# Patient Record
Sex: MALE
[De-identification: names, ages, dates, MRNs, and addresses within clinical notes are randomized per-mention and may not be internally consistent; named-entity substitution may affect disease eponyms.]

---

## 2019-10-16 ENCOUNTER — APPOINTMENT (OUTPATIENT)
Dept: VASCULAR SURGERY | Facility: CLINIC | Age: 69
End: 2019-10-16

## 2020-11-12 ENCOUNTER — APPOINTMENT (OUTPATIENT)
Dept: PHYSICAL MEDICINE AND REHAB | Facility: CLINIC | Age: 70
End: 2020-11-12
Payer: MEDICARE

## 2020-11-12 VITALS — SYSTOLIC BLOOD PRESSURE: 105 MMHG | OXYGEN SATURATION: 98 % | HEART RATE: 75 BPM | DIASTOLIC BLOOD PRESSURE: 64 MMHG

## 2020-11-12 DIAGNOSIS — Z87.39 PERSONAL HISTORY OF OTHER DISEASES OF THE MUSCULOSKELETAL SYSTEM AND CONNECTIVE TISSUE: ICD-10-CM

## 2020-11-12 DIAGNOSIS — Z86.39 PERSONAL HISTORY OF OTHER ENDOCRINE, NUTRITIONAL AND METABOLIC DISEASE: ICD-10-CM

## 2020-11-12 PROCEDURE — 99204 OFFICE O/P NEW MOD 45 MIN: CPT

## 2020-11-12 RX ORDER — FUROSEMIDE 80 MG/1
TABLET ORAL
Refills: 0 | Status: ACTIVE | COMMUNITY

## 2020-11-12 RX ORDER — PREGABALIN 25 MG/1
25 CAPSULE ORAL TWICE DAILY
Qty: 60 | Refills: 1 | Status: ACTIVE | COMMUNITY
Start: 2020-11-12 | End: 1900-01-01

## 2020-11-12 RX ORDER — LEVOTHYROXINE SODIUM 137 UG/1
TABLET ORAL
Refills: 0 | Status: ACTIVE | COMMUNITY

## 2020-11-12 RX ORDER — SPIRONOLACTONE 50 MG/1
TABLET ORAL
Refills: 0 | Status: ACTIVE | COMMUNITY

## 2020-11-12 RX ORDER — OMEPRAZOLE 20 MG/1
20 CAPSULE, DELAYED RELEASE ORAL DAILY
Refills: 0 | Status: ACTIVE | COMMUNITY

## 2020-11-12 RX ORDER — TAMSULOSIN HYDROCHLORIDE 0.4 MG/1
0.4 CAPSULE ORAL
Refills: 0 | Status: ACTIVE | COMMUNITY

## 2020-11-17 ENCOUNTER — APPOINTMENT (OUTPATIENT)
Dept: OTOLARYNGOLOGY | Facility: CLINIC | Age: 70
End: 2020-11-17
Payer: MEDICARE

## 2020-11-17 DIAGNOSIS — H90.3 SENSORINEURAL HEARING LOSS, BILATERAL: ICD-10-CM

## 2020-11-17 DIAGNOSIS — R42 DIZZINESS AND GIDDINESS: ICD-10-CM

## 2020-11-17 PROCEDURE — 99204 OFFICE O/P NEW MOD 45 MIN: CPT

## 2020-11-18 PROBLEM — H90.3 BILATERAL SENSORINEURAL HEARING LOSS: Status: ACTIVE | Noted: 2020-11-18

## 2020-11-18 PROBLEM — R42 DISEQUILIBRIUM: Status: ACTIVE | Noted: 2020-11-18

## 2020-11-18 NOTE — END OF VISIT
[FreeTextEntry3] : I was present for pertinent history and physical examination.  Agree with plan as above.

## 2020-11-18 NOTE — ASSESSMENT
[FreeTextEntry1] : Mr. 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  He presents for evaluation of neuropathy. \par \par Based on clinical examination and review of available imaging, suspect etiology of symptoms multifactorial possibly related to chemotherapy and vascular insufficiency with accompanied edema.\par \par Plan:\par - Prescribed Pregablin 25mg BID for neuropathic pain. Patient did not tolerate Neurontin in the past. \par - Patient without gait dysfunction at this time and would defer physical therapy referral at this time.\par - Educated patient on proper use of compression stockings to control edema.\par - follow up in 1 month to re-evaluate symptoms and medication tolerance

## 2020-11-18 NOTE — HISTORY OF PRESENT ILLNESS
[FreeTextEntry1] : Mr. 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  He presents for evaluation of neuropathy. \par \par Patient reports he has had progressive lower extremity paresthesias for the past few months that are painful in nature. He states he did not have these symptoms during or after completing chemotherapy. Sensation is in both legs from b/l shin down to dorsum and plantar aspect of feet. Denies any significant back pain or radiation down the legs. He feels he has sensation but describes the discomfort as painful and worse at end of the day and night.  He denies feeling unsteady on his feet or recent falls or near-falls. Does not need any assisted devices. 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 \par \par Of note, patient reports he has had extensive work up for this pain/discomfort.  He states he has had "nerve tests" in the past by a neurologist.  Recent lab work was relatively unremarkable (LFTs, B12, A1c within normal limits, Platelets >80).  Additionally, reports a history of laminectomy years ago at [facility] and has been stable from a back pain standpoint. Dopplers negative for DVT.   He states he was seen  by a vascular doctor and had a procedure performed earlier this year in both legs. Unclear what exact procedure it was. \par \par For pain he reports that he has tried gabapentin but did not tolerate it as it caused him to have strange behaviors where he would be forgetful about what he was doing. He has used numerous topical creams for pain including voltaren gel as well as capsaicin cream with limited relief. \par  \par \par

## 2020-11-18 NOTE — SOCIAL HISTORY
[Private Home] : in a private home [No Device Needed] : Patient doesn't use a device for ambulation [de-identified] : family

## 2020-11-18 NOTE — ADDENDUM
[FreeTextEntry1] : Patient tolerating lyrica well without side effects.  Will increase to 25mg in the morning and 50mg at night for one week.  If tolerating then increase to 50mg BID.  Follow up in 2 weeks.

## 2020-11-18 NOTE — PHYSICAL EXAM
[FreeTextEntry1] : General: Awake and alert in no acute distress\par Psych: normal mood and affect\par HEENT: NC/AT, normal visual tracking\par Pulmonary: no resp distress, chest expansion appears symmetrical\par CV: extremities are warm and perfused\par Abd: non-distended\par Ext: no c/c/e\par Skin: chronic vascular skin changes, 1+ pitting edema b/l L>R, tense \par \par \par Gait - non-antalgic, good foot clearance\par Inspection: normal muscle bulk without asymmetry\par Tenderness to palpation: no TTP over PSIS, lumbar paraspinal, greater trochanter, sacroiliac joints, piriformis\par ROM: within functional limits\par MMT: 5/5 bilateral lower extremities (HF, KE, KF, DF, PF, EHL)\par Reflexes: symmetric bilateral achilles and patella , no clonus\par Sensory: intact to light touch in all dermatomes of the bilateral lower extremities\par \par Provocative testing:\par modified SLR - negative\par

## 2020-11-18 NOTE — PHYSICAL EXAM
[Hearing Loss Right Only] : diminished [Hearing Loss Left Only] : diminished [Rinne Test Air Conduction Persists > Bone Conduction Right] : air conduction greater than bone conduction on the right [Rinne Test Air Conduction Persists > Bone Conduction Left] : air conduction greater than bone conduction on the left [Perry Test Lateralizes To Right] : tone lateralization to the right [Nystagmus] : ~T no ~M nystagmus was seen [Fukuda Step Test] : Fukuda Step Test was Positive [Romberg's Sign] : Romberg's sign was present [Fistula Sign] : Fistula Sign: Negative [Past-Pointing] : Past-Pointing: Negative [Caroline-Halljeannieke] : Chadron-Hallpike: Negative [de-identified] : wide based [FreeTextEntry1] : +HIT bilaterally [Midline] : trachea located in midline position [Normal] : no rashes

## 2020-11-18 NOTE — HISTORY OF PRESENT ILLNESS
[de-identified] : 71yo man with disequilibrium while walking\par since last yr\par much worse last 2 mos\par has h/o esophageal ca, s/p rsxn, chemo and RT\par was told had peripheral neuropathy lower extremities\par also had hearing test, L ear worse per pt\par eye exam ok, had cataracts removed 2 yrs ago\par pt is a gutiérrez, still able to work\par

## 2020-11-18 NOTE — REVIEW OF SYSTEMS
[Joint Pain] : joint pain [Negative] : Psychiatric [FreeTextEntry9] : as per HPI [de-identified] : paresthesias b/l feet

## 2020-12-03 ENCOUNTER — APPOINTMENT (OUTPATIENT)
Dept: PHYSICAL MEDICINE AND REHAB | Facility: CLINIC | Age: 70
End: 2020-12-03
Payer: MEDICARE

## 2020-12-03 VITALS — TEMPERATURE: 98.2 F

## 2020-12-03 VITALS — SYSTOLIC BLOOD PRESSURE: 137 MMHG | OXYGEN SATURATION: 98 % | DIASTOLIC BLOOD PRESSURE: 78 MMHG | HEART RATE: 65 BPM

## 2020-12-03 DIAGNOSIS — M79.18 MYALGIA, OTHER SITE: ICD-10-CM

## 2020-12-03 PROCEDURE — 99214 OFFICE O/P EST MOD 30 MIN: CPT | Mod: 25

## 2020-12-03 PROCEDURE — 98929 OSTEOPATH MANJ 9-10 REGIONS: CPT

## 2020-12-03 RX ORDER — PREGABALIN 75 MG/1
75 CAPSULE ORAL TWICE DAILY
Qty: 60 | Refills: 1 | Status: ACTIVE | COMMUNITY
Start: 2020-12-03 | End: 1900-01-01

## 2020-12-03 NOTE — ASSESSMENT
[FreeTextEntry1] : Mr. 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  Will increase Lyrica to 75 mg twice a day.\par -Laboratory values reviewed prior to initiating change.\par \par #Impaired balance\par -Multifactorial secondary to neuropathy and disequilibrium\par -Continue physical therapy\par -Continue to monitor platelet counts, given fall risk.\par \par #Myofascial pain:\par -Diffuse myofascial pain on exam today.  Patient is interested in trying OMT today.\par \par Osteopathic structural exam demonstrates somatic dysfunction patient agreed to osteopathic manual medicine.\par \par 1.  Somatic dysfunction cranial\par -OMT performed craniosacral therapy\par 2.  Somatic dysfunction cervical\par -OMT performed counterstrain\par 3.  Somatic dysfunction thoracic\par -OMT performed with muscle energy\par 4.  Somatic dysfunction upper extremity\par -OMT performed counterstrain\par 5.  Somatic dysfunction lumbar\par -OMT performed counterstrain\par 6.  Somatic dysfunction pelvis\par -OMT performed with muscle energy\par 7.  Somatic dysfunction sacrum\par -OMT performed with myofascial release\par 8.  Somatic dysfunction rib\par -OMT performed with muscle energy\par 9.  Somatic dysfunction lower extremity\par -OMT performed with myofascial release and muscle energy\par \par Patient tolerated the treatment well.\par \par Follow-up in 3-4 weeks. \par \par

## 2020-12-03 NOTE — PHYSICAL EXAM
[FreeTextEntry1] : Gen: Patient is A&O x 3, NAD\par HEENT: EOMI, hearing grossly normal\par Resp: regular, non - labored\par CV: pulses regular\par Skin: no rashes, erythema\par Lymph: + bilateral lower extremity 1+ edema\par Inspection: no instability or misalignment\par ROM: Limited in left shoulder abduction to 130 degrees\par Palpation:  TTP lumbar and thoracic paraspinals \par Sensation: decreased distally and symmetrically \par Reflexes: 1+ and symmetric throughout\par Strength: 5/5 throughout\par Special tests: -Straight leg raise, -Sapp's sign \par Gait: unable to stand tandem\par \par Osteopathic Structural Exam:\par Cranial: Right torsion\par Cervical: Right C3, C6 tenderpoints\par Thoracic: T3 ERRSBR, T4 ERLSBL\par Rib: Left superior rib 1 \par Upper extremity: Left levator scapula tenderpoint\par Lumbar: L5 tenderpoint posterior \par Pelvis: Left medial innominate \par Sacrum: Sacral flexion\par Lower Extremity: Left hip restricted in internal rotation, right hip restricted in external rotation \par \par \par \par \par \par

## 2020-12-31 ENCOUNTER — APPOINTMENT (OUTPATIENT)
Dept: PHYSICAL MEDICINE AND REHAB | Facility: CLINIC | Age: 70
End: 2020-12-31
Payer: MEDICARE

## 2020-12-31 VITALS — OXYGEN SATURATION: 98 % | SYSTOLIC BLOOD PRESSURE: 130 MMHG | DIASTOLIC BLOOD PRESSURE: 77 MMHG | HEART RATE: 72 BPM

## 2020-12-31 DIAGNOSIS — M99.06 SEGMENTAL AND SOMATIC DYSFUNCTION OF LOWER EXTREMITY: ICD-10-CM

## 2020-12-31 DIAGNOSIS — M99.08 SEGMENTAL AND SOMATIC DYSFUNCTION OF RIB CAGE: ICD-10-CM

## 2020-12-31 DIAGNOSIS — M79.2 NEURALGIA AND NEURITIS, UNSPECIFIED: ICD-10-CM

## 2020-12-31 PROCEDURE — 98929 OSTEOPATH MANJ 9-10 REGIONS: CPT

## 2020-12-31 PROCEDURE — 99214 OFFICE O/P EST MOD 30 MIN: CPT | Mod: 25

## 2020-12-31 RX ORDER — PREGABALIN 50 MG/1
50 CAPSULE ORAL
Qty: 90 | Refills: 1 | Status: ACTIVE | COMMUNITY
Start: 2020-12-31 | End: 1900-01-01

## 2020-12-31 NOTE — PHYSICAL EXAM
[FreeTextEntry1] : Gen: Patient is A&O x 3, NAD\par HEENT: EOMI, hearing grossly normal\par Resp: regular, non - labored\par CV: pulses regular\par Skin: no rashes, erythema\par Lymph: + bilateral lower extremity 1+ edema\par Inspection: no instability or misalignment\par ROM: Limited in left shoulder abduction to 140 degrees\par Palpation: TTP lumbar and thoracic paraspinals and cervical paraspinals \par Sensation: decreased distally and symmetrically \par Reflexes: 1+ and symmetric throughout\par Strength: 5/5 throughout\par Special tests: -Straight leg raise, -Sapp's sign \par Gait: unable to stand tandem, forward flexed posture \par \par Osteopathic Structural Exam:\par Cranial: Right torsion\par Cervical: Left C2 tenderpoint \par Thoracic: T2-T4 NRRSBL\par Rib: Left superior rib 1 \par Upper extremity: Right levator scapula tenderpoint\par Lumbar: L4-L5 NRLSBR\par Pelvis: Right superior innominate \par Sacrum: Sacral flexion \par Lower Extremity: Right hip restricted in internal rotation \par \par

## 2020-12-31 NOTE — ASSESSMENT
[FreeTextEntry1] : Mr. 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  CMP reviewed.  \par \par #Impaired balance\par -Multifactorial secondary to neuropathy and disequilibrium\par -Will restart vestibular therapy after shoulder surgery.\par -Educated on home exercise program for lumbar strengthening, while avoiding increasing intra-abdominal pressure \par \par #Myofascial pain:\par -Diffuse myofascial pain on exam today. Patient is interested in trying OMT today.\par \par Osteopathic structural exam demonstrates somatic dysfunction patient agreed to osteopathic manual medicine.\par \par 1. Somatic dysfunction cranial\par -OMT performed craniosacral therapy\par 2. Somatic dysfunction cervical\par -OMT performed counterstrain\par 3. Somatic dysfunction thoracic\par -OMT performed with muscle energy\par 4. Somatic dysfunction upper extremity\par -OMT performed with counterstrain\par 5. Somatic dysfunction lumbar\par -OMT performed with myofascial release \par 6. Somatic dysfunction pelvis\par -OMT performed with muscle energy\par 7. Somatic dysfunction sacrum\par -OMT performed with myofascial release\par 8. Somatic dysfunction rib\par -OMT performed with muscle energy and myofascial release \par 9. Somatic dysfunction lower extremity\par -OMT performed with myofascial release and muscle energy\par \par Patient tolerated the treatment well.\par \par Follow-up in 3-4 weeks. \par

## 2020-12-31 NOTE — HISTORY OF PRESENT ILLNESS
[FreeTextEntry1] : Mr. 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 \par \par He is presenting today for follow-up evaluation.  He is currently taking Lyrica 75 mg twice a day.  He reports he thinks this may be helping a little.  He does feel increased sedation during the day however and pain is worse at night.  He denies any side effects other than some daytime sedation.  Neuropathy is still worse in his bilateral feet worse at night with light touch.\par \par \par He still reports low back pain and upper arm tightness.  He is planning to undergo left shoulder arthroscopy within the next week.  He thought OMT was slightly helpful last time.  He has stopped vestibular therapy due to his upcoming surgery.\par

## 2021-01-28 ENCOUNTER — APPOINTMENT (OUTPATIENT)
Dept: PHYSICAL MEDICINE AND REHAB | Facility: CLINIC | Age: 71
End: 2021-01-28
Payer: MEDICARE

## 2021-01-28 VITALS — HEART RATE: 78 BPM | SYSTOLIC BLOOD PRESSURE: 135 MMHG | RESPIRATION RATE: 16 BRPM | DIASTOLIC BLOOD PRESSURE: 84 MMHG

## 2021-01-28 PROCEDURE — 98929 OSTEOPATH MANJ 9-10 REGIONS: CPT

## 2021-01-28 PROCEDURE — 99214 OFFICE O/P EST MOD 30 MIN: CPT | Mod: 25

## 2021-01-28 RX ORDER — PREGABALIN 50 MG/1
50 CAPSULE ORAL
Qty: 90 | Refills: 0 | Status: ACTIVE | COMMUNITY
Start: 2021-01-28 | End: 1900-01-01

## 2021-01-28 NOTE — REVIEW OF SYSTEMS
[Negative] : Heme/Lymph [FreeTextEntry9] : + Neck pain, +Low back pain [de-identified] : + Numbness in feet

## 2021-01-28 NOTE — HISTORY OF PRESENT ILLNESS
[FreeTextEntry1] : Mr. 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 \par \par He reports that since his last visit he increased Lyrica to 100 mg at night.  He reports he is sleeping better throughout the night.  He is having less cramping and spasms in his legs.  He does have some pain prior to going to bed.\par \par He reports his balance is mildly improving.  He had to stop vestibular therapy due to his recent shoulder surgery.  He continues in physical therapy for shoulder.\par \par He reports he continues to have spasms in his neck and his low back.  OMT at his last visit continue to help with this pain.  He denies any new weakness or numbness and tingling.\par

## 2021-01-28 NOTE — ASSESSMENT
[FreeTextEntry1] : Mr. 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) with mildly enlarged LNs on both sides, hemochromatosis, thrombocytopenia d/t liver cirrhosis, s/p neoadjuvant chemo­XRT, followed by surgical resection with esophagectomy/gastric pull through and LN resection all negative, s/p paracentesis, now with worsening ventral hernias, s/p hernia repair surgery end of 9/18.\par \par #Neuropathy\par -Previous intolerance to gabapentin\par -Increase Lyrica to 150mg nightly\par -CMP, CBC reviewed\par -Oncology notes reviewed \par \par #Impaired balance\par -Multifactorial secondary to neuropathy and disequilibrium\par -Will restart vestibular therapy after shoulder surgery.\par -Cranial therapy performed today\par \par #Myofascial pain:\par -Osteopathic structural exam demonstrates somatic dysfunction patient agreed to osteopathic manual medicine.\par \par 1. Somatic dysfunction cranial\par -OMT performed craniosacral therapy and temporal balancing \par 2. Somatic dysfunction cervical\par -OMT performed counterstrain\par 3. Somatic dysfunction thoracic\par -OMT performed with muscle energy\par 4. Somatic dysfunction upper extremity\par -OMT performed with counterstrain\par 5. Somatic dysfunction lumbar\par -OMT performed with muscle energy \par 6. Somatic dysfunction pelvis\par -OMT performed with muscle energy\par 7. Somatic dysfunction sacrum\par -OMT performed with craniosacral therapy \par 8. Somatic dysfunction rib\par -OMT performed with counterstrain  \par 9. Somatic dysfunction lower extremity\par -OMT performed with myofascial release and muscle energy\par \par Patient tolerated the treatment well.\par \par Follow-up in 3-4 weeks. \par

## 2021-02-18 ENCOUNTER — APPOINTMENT (OUTPATIENT)
Dept: PHYSICAL MEDICINE AND REHAB | Facility: CLINIC | Age: 71
End: 2021-02-18
Payer: MEDICARE

## 2021-02-18 VITALS — DIASTOLIC BLOOD PRESSURE: 78 MMHG | HEART RATE: 82 BPM | SYSTOLIC BLOOD PRESSURE: 142 MMHG | RESPIRATION RATE: 16 BRPM

## 2021-02-18 DIAGNOSIS — G62.9 POLYNEUROPATHY, UNSPECIFIED: ICD-10-CM

## 2021-02-18 DIAGNOSIS — M99.02 SEGMENTAL AND SOMATIC DYSFUNCTION OF THORACIC REGION: ICD-10-CM

## 2021-02-18 DIAGNOSIS — M99.03 SEGMENTAL AND SOMATIC DYSFUNCTION OF LUMBAR REGION: ICD-10-CM

## 2021-02-18 DIAGNOSIS — M99.01 SEGMENTAL AND SOMATIC DYSFUNCTION OF CERVICAL REGION: ICD-10-CM

## 2021-02-18 DIAGNOSIS — M99.05 SEGMENTAL AND SOMATIC DYSFUNCTION OF PELVIC REGION: ICD-10-CM

## 2021-02-18 DIAGNOSIS — M99.00 SEGMENTAL AND SOMATIC DYSFUNCTION OF HEAD REGION: ICD-10-CM

## 2021-02-18 DIAGNOSIS — R26.89 OTHER ABNORMALITIES OF GAIT AND MOBILITY: ICD-10-CM

## 2021-02-18 DIAGNOSIS — M99.07 SEGMENTAL AND SOMATIC DYSFUNCTION OF UPPER EXTREMITY: ICD-10-CM

## 2021-02-18 DIAGNOSIS — M99.04 SEGMENTAL AND SOMATIC DYSFUNCTION OF SACRAL REGION: ICD-10-CM

## 2021-02-18 PROCEDURE — 98928 OSTEOPATH MANJ 7-8 REGIONS: CPT

## 2021-02-18 PROCEDURE — 99214 OFFICE O/P EST MOD 30 MIN: CPT | Mod: 25

## 2021-02-18 NOTE — PHYSICAL EXAM
[FreeTextEntry1] : Gen: Patient is A&O x 3, NAD\par HEENT: EOMI, hearing grossly normal\par Resp: regular, non - labored\par CV: pulses regular\par Skin: no rashes, erythema\par Lymph: + bilateral lower extremity 1+ edema\par Inspection: no instability or misalignment\par ROM: Limited in left shoulder abduction to 160 degrees\par Palpation: TTP lumbar and cervical paraspinals \par Sensation: decreased distally and symmetrically in bilateral feet\par Reflexes: 1+ and symmetric throughout\par Strength: 5/5 throughout\par Special tests: -Straight leg raise, -Sapp's sign \par Gait: Improved 2 point gait pattern without balance checks \par \par Osteopathic Structural Exam:\par Cranial: Left Right torsion \par Cervical: Right C4 tenderpoint \par Thoracic: T4-6 NRRSBL\par Upper extremity: Right levator scapula and trapezius tenderpoints \par Lumbar: L4-5 NRLSBR\par Pelvis: Left superior innominate \par Sacrum: Sacral flexion \par \par

## 2021-02-18 NOTE — ASSESSMENT
[FreeTextEntry1] : Mr. 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) with mildly enlarged LNs on both sides, hemochromatosis, thrombocytopenia d/t liver cirrhosis, s/p neoadjuvant chemo­XRT, followed by surgical resection with esophagectomy/gastric pull through and LN resection all negative, s/p paracentesis, now with worsening ventral hernias, s/p hernia repair surgery.\par \par #Neuropathy\par -Previous intolerance to gabapentin\par -Increase Lyrica to 200mg nightly\par -CMP, CBC reviewed\par -Oncology notes reviewed \par \par #Impaired balance\par -Multifactorial secondary to neuropathy and disequilibrium\par -Improving with cranial therapy, consider restarting vestibular therapy once shoulder has improved\par \par #Myofascial pain:\par -Osteopathic structural exam demonstrates somatic dysfunction patient agreed to osteopathic manual medicine.\par \par 1. Somatic dysfunction cranial\par -OMT performed craniosacral therapy and temporal balancing \par 2. Somatic dysfunction cervical\par -OMT performed counterstrain\par 3. Somatic dysfunction thoracic\par -OMT performed with muscle energy\par 4. Somatic dysfunction upper extremity\par -OMT performed with counterstrain\par 5. Somatic dysfunction lumbar\par -OMT performed with muscle energy \par 6. Somatic dysfunction pelvis\par -OMT performed with muscle energy\par 7. Somatic dysfunction sacrum\par -OMT performed with craniosacral therapy \par \par Patient tolerated the treatment well.\par \par Follow-up in 3-4 weeks. \par

## 2021-02-18 NOTE — HISTORY OF PRESENT ILLNESS
[FreeTextEntry1] : Mr. 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 \par \par He is taking Lyrica 150 mg at night.  He reports this increase has continues to help his pain.  He still does have some episodes of shooting pain at night, but they are less severe and for a lesser duration of time.  He denies any side effects.  \par \par He reports his balance is improving and he thinks he is walking better.  He thinks the OMT is helping with this and his back/neck pain.  He continues in PT for his shoulder s/p surgery.

## 2021-02-18 NOTE — REVIEW OF SYSTEMS
[Negative] : Heme/Lymph [FreeTextEntry9] : + Neck and low back pain [de-identified] : + Neuropathy

## 2021-03-08 RX ORDER — PREGABALIN 200 MG/1
200 CAPSULE ORAL
Qty: 30 | Refills: 1 | Status: ACTIVE | COMMUNITY
Start: 2021-03-08 | End: 1900-01-01

## 2021-03-11 ENCOUNTER — APPOINTMENT (OUTPATIENT)
Dept: PHYSICAL MEDICINE AND REHAB | Facility: CLINIC | Age: 71
End: 2021-03-11

## 2021-07-29 NOTE — HISTORY OF PRESENT ILLNESS
Most spouse called for recommendation(this encounter was inadvertenly closed prior to reviewing dr Jennifer Nye recommendation)  Most spouse understands directions  Dr Jennifer Nye stated to keep august appt but spouse would like to confirm that is still the case since she is just starting provera today    Dr Lawyer Apodaca, please advise [FreeTextEntry1] : Mr. 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 \par \par He is presenting for follow-up evaluation of neuropathy and impaired balance.  Since his last visit he started Lyrica.  He titrated his Lyrica up to 50 mg twice a day.  He denies any side effects from this.  Thinks it may be minimally beneficial at this time.  He still reports some issues with balance.  Recently saw ENT.  He denies any recent falls.  He also is reporting diffuse tightness throughout his body.  He does have a history of a lumbar laminectomy and fusion.  He also reports shoulder surgery on both sides.  Tightness is worse in the low back and upper thoracic region.  He describes an achiness.  He denies any shooting pains new numbness or tingling or weakness.\par \par

## 2021-12-23 ENCOUNTER — APPOINTMENT (OUTPATIENT)
Dept: GASTROENTEROLOGY | Facility: CLINIC | Age: 71
End: 2021-12-23

## 2022-04-13 ENCOUNTER — TRANSCRIPTION ENCOUNTER (OUTPATIENT)
Age: 72
End: 2022-04-13

## 2022-04-14 ENCOUNTER — TRANSCRIPTION ENCOUNTER (OUTPATIENT)
Age: 72
End: 2022-04-14

## 2022-04-14 ENCOUNTER — OUTPATIENT (OUTPATIENT)
Dept: OUTPATIENT SERVICES | Facility: HOSPITAL | Age: 72
LOS: 1 days | End: 2022-04-14
Payer: MEDICARE

## 2022-04-14 ENCOUNTER — RESULT REVIEW (OUTPATIENT)
Age: 72
End: 2022-04-14

## 2022-04-14 VITALS
TEMPERATURE: 98 F | DIASTOLIC BLOOD PRESSURE: 83 MMHG | RESPIRATION RATE: 18 BRPM | HEART RATE: 92 BPM | OXYGEN SATURATION: 97 % | SYSTOLIC BLOOD PRESSURE: 130 MMHG

## 2022-04-14 VITALS
TEMPERATURE: 98 F | OXYGEN SATURATION: 95 % | RESPIRATION RATE: 17 BRPM | HEIGHT: 67 IN | SYSTOLIC BLOOD PRESSURE: 117 MMHG | DIASTOLIC BLOOD PRESSURE: 82 MMHG | HEART RATE: 63 BPM | WEIGHT: 175.93 LBS

## 2022-04-14 DIAGNOSIS — Z98.890 OTHER SPECIFIED POSTPROCEDURAL STATES: Chronic | ICD-10-CM

## 2022-04-14 DIAGNOSIS — Z85.01 PERSONAL HISTORY OF MALIGNANT NEOPLASM OF ESOPHAGUS: Chronic | ICD-10-CM

## 2022-04-14 DIAGNOSIS — N62 HYPERTROPHY OF BREAST: ICD-10-CM

## 2022-04-14 DIAGNOSIS — N63.0 UNSPECIFIED LUMP IN UNSPECIFIED BREAST: ICD-10-CM

## 2022-04-14 LAB
ABO RH CONFIRMATION: SIGNIFICANT CHANGE UP
BLD GP AB SCN SERPL QL: SIGNIFICANT CHANGE UP
HCT VFR BLD CALC: 45.1 % — SIGNIFICANT CHANGE UP (ref 39–50)
HGB BLD-MCNC: 15.4 G/DL — SIGNIFICANT CHANGE UP (ref 13–17)
MCHC RBC-ENTMCNC: 34.1 GM/DL — SIGNIFICANT CHANGE UP (ref 32–36)
MCHC RBC-ENTMCNC: 34.8 PG — HIGH (ref 27–34)
MCV RBC AUTO: 101.8 FL — HIGH (ref 80–100)
NRBC # BLD: 0 /100 WBCS — SIGNIFICANT CHANGE UP (ref 0–0)
PLATELET # BLD AUTO: 77 K/UL — LOW (ref 150–400)
RBC # BLD: 4.43 M/UL — SIGNIFICANT CHANGE UP (ref 4.2–5.8)
RBC # FLD: 14 % — SIGNIFICANT CHANGE UP (ref 10.3–14.5)
WBC # BLD: 3.89 K/UL — SIGNIFICANT CHANGE UP (ref 3.8–10.5)
WBC # FLD AUTO: 3.89 K/UL — SIGNIFICANT CHANGE UP (ref 3.8–10.5)

## 2022-04-14 PROCEDURE — 88305 TISSUE EXAM BY PATHOLOGIST: CPT

## 2022-04-14 PROCEDURE — P9037: CPT

## 2022-04-14 PROCEDURE — P9100: CPT

## 2022-04-14 PROCEDURE — 85027 COMPLETE CBC AUTOMATED: CPT

## 2022-04-14 PROCEDURE — 36415 COLL VENOUS BLD VENIPUNCTURE: CPT

## 2022-04-14 PROCEDURE — 88305 TISSUE EXAM BY PATHOLOGIST: CPT | Mod: 26

## 2022-04-14 PROCEDURE — 86900 BLOOD TYPING SEROLOGIC ABO: CPT

## 2022-04-14 PROCEDURE — 86850 RBC ANTIBODY SCREEN: CPT

## 2022-04-14 PROCEDURE — 36430 TRANSFUSION BLD/BLD COMPNT: CPT | Mod: XU

## 2022-04-14 PROCEDURE — 19120 REMOVAL OF BREAST LESION: CPT | Mod: 50

## 2022-04-14 PROCEDURE — 86901 BLOOD TYPING SEROLOGIC RH(D): CPT

## 2022-04-14 DEVICE — CLIP APPLIER ETHICON LIGACLIP 11.5" MEDIUM: Type: IMPLANTABLE DEVICE | Status: FUNCTIONAL

## 2022-04-14 DEVICE — CLIP APPLIER ETHICON LIGACLIP 9 3/8" SMALL: Type: IMPLANTABLE DEVICE | Status: FUNCTIONAL

## 2022-04-14 RX ORDER — ONDANSETRON 8 MG/1
4 TABLET, FILM COATED ORAL ONCE
Refills: 0 | Status: DISCONTINUED | OUTPATIENT
Start: 2022-04-14 | End: 2022-04-14

## 2022-04-14 RX ORDER — TAMSULOSIN HYDROCHLORIDE 0.4 MG/1
2 CAPSULE ORAL
Qty: 0 | Refills: 0 | DISCHARGE

## 2022-04-14 RX ORDER — SODIUM CHLORIDE 9 MG/ML
1000 INJECTION, SOLUTION INTRAVENOUS
Refills: 0 | Status: DISCONTINUED | OUTPATIENT
Start: 2022-04-14 | End: 2022-04-14

## 2022-04-14 RX ORDER — LEVOTHYROXINE SODIUM 125 MCG
1 TABLET ORAL
Qty: 0 | Refills: 0 | DISCHARGE

## 2022-04-14 RX ORDER — FOLIC ACID 0.8 MG
1 TABLET ORAL
Qty: 0 | Refills: 0 | DISCHARGE

## 2022-04-14 RX ORDER — HYDROMORPHONE HYDROCHLORIDE 2 MG/ML
1 INJECTION INTRAMUSCULAR; INTRAVENOUS; SUBCUTANEOUS
Refills: 0 | Status: DISCONTINUED | OUTPATIENT
Start: 2022-04-14 | End: 2022-04-14

## 2022-04-14 RX ORDER — HYDROMORPHONE HYDROCHLORIDE 2 MG/ML
0.5 INJECTION INTRAMUSCULAR; INTRAVENOUS; SUBCUTANEOUS
Refills: 0 | Status: DISCONTINUED | OUTPATIENT
Start: 2022-04-14 | End: 2022-04-14

## 2022-04-14 RX ORDER — PANTOPRAZOLE SODIUM 20 MG/1
0 TABLET, DELAYED RELEASE ORAL
Qty: 0 | Refills: 0 | DISCHARGE

## 2022-04-14 RX ORDER — BACLOFEN 100 %
0 POWDER (GRAM) MISCELLANEOUS
Qty: 0 | Refills: 0 | DISCHARGE

## 2022-04-14 RX ORDER — OXYCODONE HYDROCHLORIDE 5 MG/1
5 TABLET ORAL ONCE
Refills: 0 | Status: DISCONTINUED | OUTPATIENT
Start: 2022-04-14 | End: 2022-04-14

## 2022-04-14 RX ORDER — FUROSEMIDE 40 MG
3 TABLET ORAL
Qty: 0 | Refills: 0 | DISCHARGE

## 2022-04-14 RX ORDER — ZOLPIDEM TARTRATE 10 MG/1
0.5 TABLET ORAL
Qty: 0 | Refills: 0 | DISCHARGE

## 2022-04-14 RX ORDER — METOPROLOL TARTRATE 50 MG
1 TABLET ORAL
Qty: 0 | Refills: 0 | DISCHARGE

## 2022-04-14 RX ADMIN — SODIUM CHLORIDE 50 MILLILITER(S): 9 INJECTION, SOLUTION INTRAVENOUS at 09:02

## 2022-04-14 NOTE — ASU DISCHARGE PLAN (ADULT/PEDIATRIC) - NS MD DC FALL RISK RISK
For information on Fall & Injury Prevention, visit: [email or web address] OR  [email or web address] OR  https://www.cdc.gov/steadi/patient.html

## 2022-04-14 NOTE — ASU DISCHARGE PLAN (ADULT/PEDIATRIC) - CALL YOUR DOCTOR IF YOU HAVE ANY OF THE FOLLOWING:
Bleeding that does not stop/Swelling that gets worse/Pain not relieved by Medications/Inability to tolerate liquids or foods Bleeding that does not stop/Swelling that gets worse/Pain not relieved by Medications/Fever greater than (need to indicate Fahrenheit or Celsius)/Wound/Surgical Site with redness, or foul smelling discharge or pus/Nausea and vomiting that does not stop/Inability to tolerate liquids or foods

## 2022-04-14 NOTE — ASU PATIENT PROFILE, ADULT - NSICDXPASTMEDICALHX_GEN_ALL_CORE_FT
PAST MEDICAL HISTORY:  Arthritis     Cardiac arrhythmia     Cirrhosis     Esophageal cancer     History of depression     Hypothyroidism

## 2022-04-14 NOTE — ASU DISCHARGE PLAN (ADULT/PEDIATRIC) - CARE PROVIDER_API CALL
Ezequiel Griffin)  Plastic Surgery  [address], [address]  [place] [address]  Phone: [phone]  Fax: [phone]  Follow Up Time: 1 week

## 2022-04-14 NOTE — ASU PATIENT PROFILE, ADULT - NSICDXPASTSURGICALHX_GEN_ALL_CORE_FT
PAST SURGICAL HISTORY:  H/O repair of left rotator cuff     H/O: knee surgery     History of esophageal cancer surgery    History of laminectomy     History of umbilical hernia repair

## 2022-04-14 NOTE — BRIEF OPERATIVE NOTE - NSICDXBRIEFPROCEDURE_GEN_ALL_CORE_FT
Sacha Mendoza(Attending)
PROCEDURES:  Excision of gynecomastia 14-Apr-2022 12:19:57 bilateral Emanuel Alberts

## 2022-04-26 LAB — SURGICAL PATHOLOGY STUDY: SIGNIFICANT CHANGE UP

## 2022-05-26 PROBLEM — C15.9 MALIGNANT NEOPLASM OF ESOPHAGUS, UNSPECIFIED: Chronic | Status: ACTIVE | Noted: 2022-04-14

## 2022-05-26 PROBLEM — E03.9 HYPOTHYROIDISM, UNSPECIFIED: Chronic | Status: ACTIVE | Noted: 2022-04-14

## 2022-05-26 PROBLEM — I49.9 CARDIAC ARRHYTHMIA, UNSPECIFIED: Chronic | Status: ACTIVE | Noted: 2022-04-14

## 2022-05-26 PROBLEM — M19.90 UNSPECIFIED OSTEOARTHRITIS, UNSPECIFIED SITE: Chronic | Status: ACTIVE | Noted: 2022-04-14

## 2022-05-26 PROBLEM — K74.60 UNSPECIFIED CIRRHOSIS OF LIVER: Chronic | Status: ACTIVE | Noted: 2022-04-14

## 2022-05-26 PROBLEM — Z86.59 PERSONAL HISTORY OF OTHER MENTAL AND BEHAVIORAL DISORDERS: Chronic | Status: ACTIVE | Noted: 2022-04-14

## 2022-06-07 ENCOUNTER — APPOINTMENT (OUTPATIENT)
Dept: ULTRASOUND IMAGING | Facility: CLINIC | Age: 72
End: 2022-06-07

## 2022-06-07 ENCOUNTER — OUTPATIENT (OUTPATIENT)
Dept: OUTPATIENT SERVICES | Facility: HOSPITAL | Age: 72
LOS: 1 days | End: 2022-06-07
Payer: MEDICARE

## 2022-06-07 DIAGNOSIS — Z98.890 OTHER SPECIFIED POSTPROCEDURAL STATES: Chronic | ICD-10-CM

## 2022-06-07 DIAGNOSIS — Z00.8 ENCOUNTER FOR OTHER GENERAL EXAMINATION: ICD-10-CM

## 2022-06-07 DIAGNOSIS — Z85.01 PERSONAL HISTORY OF MALIGNANT NEOPLASM OF ESOPHAGUS: Chronic | ICD-10-CM

## 2022-06-07 PROCEDURE — 76700 US EXAM ABDOM COMPLETE: CPT

## 2022-06-07 PROCEDURE — 76700 US EXAM ABDOM COMPLETE: CPT | Mod: 26

## 2022-10-13 PROBLEM — M99.00 CRANIAL SOMATIC DYSFUNCTION: Status: ACTIVE | Noted: 2020-12-03

## 2023-02-02 ENCOUNTER — APPOINTMENT (OUTPATIENT)
Dept: INTERVENTIONAL RADIOLOGY/VASCULAR | Facility: CLINIC | Age: 73
End: 2023-02-02

## 2023-02-02 DIAGNOSIS — C15.9 MALIGNANT NEOPLASM OF ESOPHAGUS, UNSPECIFIED: ICD-10-CM

## 2023-02-02 DIAGNOSIS — C80.1 MALIGNANT (PRIMARY) NEOPLASM, UNSPECIFIED: ICD-10-CM

## 2023-02-02 DIAGNOSIS — K74.60 UNSPECIFIED CIRRHOSIS OF LIVER: ICD-10-CM

## 2023-04-23 ENCOUNTER — APPOINTMENT (OUTPATIENT)
Dept: NUCLEAR MEDICINE | Facility: IMAGING CENTER | Age: 73
End: 2023-04-23

## 2023-08-03 ENCOUNTER — APPOINTMENT (OUTPATIENT)
Dept: CT IMAGING | Facility: CLINIC | Age: 73
End: 2023-08-03

## 2023-08-10 ENCOUNTER — APPOINTMENT (OUTPATIENT)
Dept: CT IMAGING | Facility: CLINIC | Age: 73
End: 2023-08-10
Payer: MEDICARE

## 2023-08-10 ENCOUNTER — OUTPATIENT (OUTPATIENT)
Dept: OUTPATIENT SERVICES | Facility: HOSPITAL | Age: 73
LOS: 1 days | End: 2023-08-10
Payer: MEDICARE

## 2023-08-10 DIAGNOSIS — Z98.890 OTHER SPECIFIED POSTPROCEDURAL STATES: Chronic | ICD-10-CM

## 2023-08-10 DIAGNOSIS — Z85.01 PERSONAL HISTORY OF MALIGNANT NEOPLASM OF ESOPHAGUS: Chronic | ICD-10-CM

## 2023-08-10 DIAGNOSIS — Z00.8 ENCOUNTER FOR OTHER GENERAL EXAMINATION: ICD-10-CM

## 2023-08-10 PROCEDURE — 74176 CT ABD & PELVIS W/O CONTRAST: CPT | Mod: 26,MH

## 2023-08-10 PROCEDURE — 74176 CT ABD & PELVIS W/O CONTRAST: CPT | Mod: MH

## 2023-11-13 NOTE — BRIEF OPERATIVE NOTE - ASSISTANT(S)
Dear Andreia Rocha,    Thank you for coming to your appointment today. I hope we have addressed all of your needs. Please make sure to do the following:  - Continue your medications as listed. - Take Bumex 2 mg daily for this week, starting next week go back to taking it three times a week  - Get labs drawn before our next follow up. - We will see each other again in 3 months    Call for a sooner appointment if you develop any new or worsening symptoms. Have a great day!     Sincerely,  Rios López M.D  11/13/2023  8:55 AM
JOSE Castaneda

## 2023-12-21 ENCOUNTER — APPOINTMENT (OUTPATIENT)
Dept: NEUROLOGY | Facility: CLINIC | Age: 73
End: 2023-12-21

## (undated) DEVICE — DRAPE SPLIT SHEET 77X108"

## (undated) DEVICE — POSITIONER STRAP ARMBOARD VELCRO TS-30 12

## (undated) DEVICE — BOWL STERILE 32OZ BLUE

## (undated) DEVICE — DRAPE HALF SHEET 40X57"

## (undated) DEVICE — CONTAINER SPECIMEN PET

## (undated) DEVICE — BLADE SAFETY LOCK #10

## (undated) DEVICE — STAPLER SKIN VISI-STAT 35 WIDE

## (undated) DEVICE — DRSG 4X4

## (undated) DEVICE — SUT MONOCRYL 3-0 27" PS-2 UNDYED

## (undated) DEVICE — SYR LUER LOK 20CC

## (undated) DEVICE — SOLIDIFIER CANN EXPRESS 3K

## (undated) DEVICE — DRAPE 3/4 SHEET 52X76"

## (undated) DEVICE — GLV 8 PROTEXIS

## (undated) DEVICE — SYR LUER LOK 10CC

## (undated) DEVICE — SUT MONOCRYL 4-0 27" PS-2 UNDYED

## (undated) DEVICE — DRSG SURGICAL BRA XL 40-42

## (undated) DEVICE — DRAPE IOBAN 23X23"

## (undated) DEVICE — DRAIN RESERVOIR EVACUATOR 100CC BARD

## (undated) DEVICE — GLV 6.5 PROTEXIS

## (undated) DEVICE — PACK MINOR

## (undated) DEVICE — CANISTER SUCTION LID GUARD 3000CC

## (undated) DEVICE — SUT PLAIN GUT FAST ABSORBING 5-0 PC-1

## (undated) DEVICE — SYR LUER LOK 50CC

## (undated) DEVICE — DRSG STERISTRIPS 0.5X4"

## (undated) DEVICE — GLV 7 PROTEXIS

## (undated) DEVICE — DRSG KLING 6"

## (undated) DEVICE — POSITIONER FOAM HEAD CRADLE

## (undated) DEVICE — DRAIN BLAKE 15FR ROUND

## (undated) DEVICE — SOL IRR POUR H2O 1500ML

## (undated) DEVICE — SUT DERMABOND 0.7ML

## (undated) DEVICE — WRAP COMPRESSION CALF MED

## (undated) DEVICE — GLV 7.5 PROTEXIS

## (undated) DEVICE — SPONGE LAP X-RAY DETECTABLE 18X18"

## (undated) DEVICE — SOL IRR POUR NS 0.9% 500ML

## (undated) DEVICE — DRSG COMBINE 5X9"

## (undated) DEVICE — BLANKET WARMER LOWER ADULT

## (undated) DEVICE — SUT POLYSORB 2-0 30" V-20 UNDYED

## (undated) DEVICE — BLADE SAFETY LOCK #15

## (undated) DEVICE — SUT POLYSORB 0 36" GS-21 UNDYED